# Patient Record
Sex: FEMALE | Race: WHITE
[De-identification: names, ages, dates, MRNs, and addresses within clinical notes are randomized per-mention and may not be internally consistent; named-entity substitution may affect disease eponyms.]

---

## 2021-04-05 ENCOUNTER — HOSPITAL ENCOUNTER (EMERGENCY)
Dept: HOSPITAL 8 - ED | Age: 41
Discharge: HOME | End: 2021-04-05
Payer: MEDICAID

## 2021-04-05 VITALS — HEIGHT: 66 IN | BODY MASS INDEX: 21.97 KG/M2 | WEIGHT: 136.69 LBS

## 2021-04-05 VITALS — SYSTOLIC BLOOD PRESSURE: 110 MMHG | DIASTOLIC BLOOD PRESSURE: 76 MMHG

## 2021-04-05 DIAGNOSIS — F17.200: ICD-10-CM

## 2021-04-05 DIAGNOSIS — T40.1X1A: Primary | ICD-10-CM

## 2021-04-05 DIAGNOSIS — Z72.9: ICD-10-CM

## 2021-04-05 DIAGNOSIS — R94.31: ICD-10-CM

## 2021-04-05 DIAGNOSIS — F11.129: ICD-10-CM

## 2021-04-05 PROCEDURE — 99283 EMERGENCY DEPT VISIT LOW MDM: CPT

## 2021-04-05 PROCEDURE — 93005 ELECTROCARDIOGRAM TRACING: CPT

## 2021-04-05 NOTE — NUR
pt satting well on room air, awake and alert, states headache is resolved and 
that she would like to go home. pt states her daughter is coming to pick her 
up, states that she feels safe to go. md aware.

## 2021-04-05 NOTE — NUR
pt in bed on cardiac monitor, head of bed elevated and bed rails up 
bilaterally. call light within reach. pt sleepy but arousable to voice, states 
that she feels ready to go home already. this rn informed pt that she needs to 
be more alert and able to breathe room air before she can leave, pt verbalizes 
understanding and agreement with plan of care. pt immediately returns to rest, 
eyes closed and respirations even and unlabored. pt with nc at 1l/min to 
maintain spo2 at 95%.

## 2021-07-06 ENCOUNTER — HOSPITAL ENCOUNTER (EMERGENCY)
Facility: MEDICAL CENTER | Age: 41
End: 2021-07-06

## 2021-07-06 VITALS
HEART RATE: 85 BPM | TEMPERATURE: 98.8 F | WEIGHT: 130 LBS | HEIGHT: 64 IN | BODY MASS INDEX: 22.2 KG/M2 | RESPIRATION RATE: 16 BRPM | SYSTOLIC BLOOD PRESSURE: 121 MMHG | OXYGEN SATURATION: 96 % | DIASTOLIC BLOOD PRESSURE: 72 MMHG

## 2021-07-06 PROCEDURE — 302449 STATCHG TRIAGE ONLY (STATISTIC)

## 2021-07-06 NOTE — ED TRIAGE NOTES
Emily Musa  40 y.o. female  Chief Complaint   Patient presents with   • Other     Pt [employer] for leagal draw       Pt ambulatory to [facility] for above complaint.   Pt is alert and oriented, speaking in full sentences, follows commands and responds appropriately to questions. Resp are even and unlabored. No behavioral indicators of pain.   Pt encouraged to alert staff for any changes. This RN masked and in appropriate PPE during encounter.

## 2021-12-27 ENCOUNTER — OFFICE VISIT (OUTPATIENT)
Dept: URGENT CARE | Facility: CLINIC | Age: 41
End: 2021-12-27

## 2021-12-27 VITALS
HEART RATE: 80 BPM | HEIGHT: 64 IN | OXYGEN SATURATION: 97 % | SYSTOLIC BLOOD PRESSURE: 100 MMHG | DIASTOLIC BLOOD PRESSURE: 80 MMHG | BODY MASS INDEX: 24.41 KG/M2 | TEMPERATURE: 97.8 F | RESPIRATION RATE: 16 BRPM | WEIGHT: 143 LBS

## 2021-12-27 DIAGNOSIS — K59.00 CONSTIPATION, UNSPECIFIED CONSTIPATION TYPE: ICD-10-CM

## 2021-12-27 DIAGNOSIS — Z87.74 HISTORY OF ARTERIOVENOUS MALFORMATION (AVM): ICD-10-CM

## 2021-12-27 PROCEDURE — 99203 OFFICE O/P NEW LOW 30 MIN: CPT | Performed by: FAMILY MEDICINE

## 2021-12-27 NOTE — PROGRESS NOTES
"  Subjective:      41 y.o. female presents to urgent care for GI concerns. She has had abdominal pain for approximately one year. The pain is intermittent, it comes right before she has a bowel movement and is relieved by the BM. It is not with every bowel movement. The pain is described as sharp and stabbing. She does have daily bowel movements, and soft. She does take metamucil twice per week. She thinks she has blood in her stool that started about one month ago. There was no inciting trauma or event at that time. The blood is intermittent, comes when her poops is hard, it coats the outside of the stool. She denies any changes to urinary urgency,frequency, dysuria, or hematuria.      She apparently had an AV malformation surgically corrected last year and is asking for a referral to Neurology for follow up.     She denies any other questions or concerns at this time.    Current problem list, medication, and past medical/surgical history were reviewed in Epic.    ROS  See HPI     Objective:      /80   Pulse 80   Temp 36.6 °C (97.8 °F)   Resp 16   Ht 1.626 m (5' 4\")   Wt 64.9 kg (143 lb)   SpO2 97%   BMI 24.55 kg/m²     Physical Exam  Constitutional:       General: She is not in acute distress.     Appearance: She is not diaphoretic.   Cardiovascular:      Rate and Rhythm: Normal rate and regular rhythm.      Heart sounds: Normal heart sounds.   Pulmonary:      Effort: Pulmonary effort is normal. No respiratory distress.      Breath sounds: Normal breath sounds.   Abdominal:      General: Bowel sounds are normal. There is no distension.      Palpations: Abdomen is soft.      Tenderness: There is no abdominal tenderness. There is no right CVA tenderness or left CVA tenderness.   Neurological:      Mental Status: She is alert.   Psychiatric:         Mood and Affect: Affect normal.         Judgment: Judgment normal.       Assessment/Plan:     1. Constipation, unspecified constipation type  She was " encouraged to use Metamucil and MiraLAX daily.  She should stay well-hydrated, drinking a minimum of 2 L water daily.    2. History of arteriovenous malformation (AVM)  Referral to neurology has been placed.  - Referral to Neurology      Instructed to return to Urgent Care or nearest Emergency Department if symptoms fail to improve, for any change in condition, further concerns, or new concerning symptoms. Patient states understanding of the plan of care and discharge instructions.    Arleth Guo M.D.